# Patient Record
Sex: FEMALE | Race: OTHER | URBAN - METROPOLITAN AREA
[De-identification: names, ages, dates, MRNs, and addresses within clinical notes are randomized per-mention and may not be internally consistent; named-entity substitution may affect disease eponyms.]

---

## 2017-01-24 ENCOUNTER — IMPORTED ENCOUNTER (OUTPATIENT)
Dept: URBAN - METROPOLITAN AREA CLINIC 50 | Facility: CLINIC | Age: 72
End: 2017-01-24

## 2017-01-27 ENCOUNTER — IMPORTED ENCOUNTER (OUTPATIENT)
Dept: URBAN - METROPOLITAN AREA CLINIC 50 | Facility: CLINIC | Age: 72
End: 2017-01-27

## 2017-01-31 ENCOUNTER — IMPORTED ENCOUNTER (OUTPATIENT)
Dept: URBAN - METROPOLITAN AREA CLINIC 50 | Facility: CLINIC | Age: 72
End: 2017-01-31

## 2017-02-07 ENCOUNTER — IMPORTED ENCOUNTER (OUTPATIENT)
Dept: URBAN - METROPOLITAN AREA CLINIC 50 | Facility: CLINIC | Age: 72
End: 2017-02-07

## 2017-02-14 ENCOUNTER — IMPORTED ENCOUNTER (OUTPATIENT)
Dept: URBAN - METROPOLITAN AREA CLINIC 50 | Facility: CLINIC | Age: 72
End: 2017-02-14

## 2017-03-13 ENCOUNTER — IMPORTED ENCOUNTER (OUTPATIENT)
Dept: URBAN - METROPOLITAN AREA CLINIC 50 | Facility: CLINIC | Age: 72
End: 2017-03-13

## 2017-03-21 ENCOUNTER — IMPORTED ENCOUNTER (OUTPATIENT)
Dept: URBAN - METROPOLITAN AREA CLINIC 50 | Facility: CLINIC | Age: 72
End: 2017-03-21

## 2017-04-11 ENCOUNTER — IMPORTED ENCOUNTER (OUTPATIENT)
Dept: URBAN - METROPOLITAN AREA CLINIC 50 | Facility: CLINIC | Age: 72
End: 2017-04-11

## 2017-05-09 ENCOUNTER — IMPORTED ENCOUNTER (OUTPATIENT)
Dept: URBAN - METROPOLITAN AREA CLINIC 50 | Facility: CLINIC | Age: 72
End: 2017-05-09

## 2017-05-25 ENCOUNTER — IMPORTED ENCOUNTER (OUTPATIENT)
Dept: URBAN - METROPOLITAN AREA CLINIC 50 | Facility: CLINIC | Age: 72
End: 2017-05-25

## 2017-07-31 ENCOUNTER — IMPORTED ENCOUNTER (OUTPATIENT)
Dept: URBAN - METROPOLITAN AREA CLINIC 50 | Facility: CLINIC | Age: 72
End: 2017-07-31

## 2018-07-30 ENCOUNTER — IMPORTED ENCOUNTER (OUTPATIENT)
Dept: URBAN - METROPOLITAN AREA CLINIC 50 | Facility: CLINIC | Age: 73
End: 2018-07-30

## 2018-07-31 ENCOUNTER — IMPORTED ENCOUNTER (OUTPATIENT)
Dept: URBAN - METROPOLITAN AREA CLINIC 50 | Facility: CLINIC | Age: 73
End: 2018-07-31

## 2018-08-10 ENCOUNTER — IMPORTED ENCOUNTER (OUTPATIENT)
Dept: URBAN - METROPOLITAN AREA CLINIC 50 | Facility: CLINIC | Age: 73
End: 2018-08-10

## 2018-10-01 ENCOUNTER — IMPORTED ENCOUNTER (OUTPATIENT)
Dept: URBAN - METROPOLITAN AREA CLINIC 50 | Facility: CLINIC | Age: 73
End: 2018-10-01

## 2019-04-10 NOTE — PATIENT DISCUSSION
New Prescription: doxycycline hyclate (doxycycline hyclate): tablet: 100 mg 1 tablet twice a day as directed by mouth 04-

## 2019-04-10 NOTE — PATIENT DISCUSSION
CHALAZION OU: PRESCRIBED WARM COMPRESSES, EYELID SCRUBS AND DOXYCYCLINE 100MG BID PO X 2 WEEKS AND TOBRADEX ROCIO QHS X 2 WEEKS. 2cc of 40 mg  KENALOG INJECTION GIVEN TODAY.

## 2019-04-10 NOTE — PATIENT DISCUSSION
New Prescription: TobraDex (tobramycin-dexamethasone): ointment: 0.3-0.1% 1 a small amount at bedtime as directed on eyelid 04-

## 2019-07-29 ENCOUNTER — IMPORTED ENCOUNTER (OUTPATIENT)
Dept: URBAN - METROPOLITAN AREA CLINIC 50 | Facility: CLINIC | Age: 74
End: 2019-07-29

## 2020-12-03 ENCOUNTER — IMPORTED ENCOUNTER (OUTPATIENT)
Dept: URBAN - METROPOLITAN AREA CLINIC 50 | Facility: CLINIC | Age: 75
End: 2020-12-03

## 2021-04-17 ASSESSMENT — TONOMETRY
OS_IOP_MMHG: 14
OS_IOP_MMHG: 12
OD_IOP_MMHG: 13
OS_IOP_MMHG: 12
OS_IOP_MMHG: 15
OS_IOP_MMHG: 11
OD_IOP_MMHG: 19
OS_IOP_MMHG: 12
OS_IOP_MMHG: 20
OD_IOP_MMHG: 12
OD_IOP_MMHG: 10
OD_IOP_MMHG: 18
OD_IOP_MMHG: 12
OS_IOP_MMHG: 15
OD_IOP_MMHG: 15
OD_IOP_MMHG: 14
OS_IOP_MMHG: 11

## 2021-04-17 ASSESSMENT — VISUAL ACUITY
OD_SC: 20/25
OS_CC: J1+
OS_CC: 20/200
OS_CC: 20/30
OD_CC: 20/20
OS_SC: 20/80
OD_SC: 20/25-
OS_SC: 20/30-2
OS_CC: J1+
OS_CC: J2-
OS_CC: J1+
OS_SC: 20/400
OD_SC: 20/30-
OD_SC: 20/70
OD_SC: 20/25-3
OS_PH: 20/40
OD_CC: J2-
OS_CC: 20/200
OS_CC: J1+@ 16 IN
OD_CC: J1+@ 16 IN
OS_PH: 20/30
OD_CC: J1+
OS_PH: 20/70
OD_CC: J1+
OS_SC: 20/200
OS_CC: 20/200
OS_SC: 20/30
OD_SC: 20/20
OS_CC: 20/70
OD_SC: 20/50+1
OD_CC: J1+
OD_CC: 20/25-3
OD_CC: 20/20
OS_SC: 20/40+

## 2021-12-15 ENCOUNTER — PREPPED CHART (OUTPATIENT)
Dept: URBAN - METROPOLITAN AREA CLINIC 53 | Facility: CLINIC | Age: 76
End: 2021-12-15

## 2021-12-16 ENCOUNTER — COMPREHENSIVE EXAM (OUTPATIENT)
Dept: URBAN - METROPOLITAN AREA CLINIC 53 | Facility: CLINIC | Age: 76
End: 2021-12-16

## 2021-12-16 DIAGNOSIS — H43.811: ICD-10-CM

## 2021-12-16 DIAGNOSIS — H43.22: ICD-10-CM

## 2021-12-16 DIAGNOSIS — H16.223: ICD-10-CM

## 2021-12-16 DIAGNOSIS — H35.372: ICD-10-CM

## 2021-12-16 DIAGNOSIS — R73.03: ICD-10-CM

## 2021-12-16 DIAGNOSIS — H35.371: ICD-10-CM

## 2021-12-16 PROCEDURE — 92014 COMPRE OPH EXAM EST PT 1/>: CPT

## 2021-12-16 PROCEDURE — 92134 CPTRZ OPH DX IMG PST SGM RTA: CPT

## 2021-12-16 PROCEDURE — 92015 DETERMINE REFRACTIVE STATE: CPT

## 2021-12-16 ASSESSMENT — TONOMETRY
OD_IOP_MMHG: 13
OS_IOP_MMHG: 13

## 2021-12-16 ASSESSMENT — VISUAL ACUITY
OD_GLARE: 20/70
OD_GLARE: 20/50
OS_GLARE: 20/50
OS_CC: 20/150
OD_CC: 20/20-1
OU_CC: J1+
OU_CC: 20/20
OS_GLARE: 20/25

## 2022-07-26 ENCOUNTER — COMPREHENSIVE EXAM (OUTPATIENT)
Dept: URBAN - METROPOLITAN AREA CLINIC 53 | Facility: CLINIC | Age: 77
End: 2022-07-26

## 2022-07-26 DIAGNOSIS — Z97.3: ICD-10-CM

## 2022-07-26 DIAGNOSIS — R73.03: ICD-10-CM

## 2022-07-26 DIAGNOSIS — H35.373: ICD-10-CM

## 2022-07-26 DIAGNOSIS — H43.811: ICD-10-CM

## 2022-07-26 DIAGNOSIS — H43.22: ICD-10-CM

## 2022-07-26 DIAGNOSIS — H16.223: ICD-10-CM

## 2022-07-26 PROCEDURE — 92014 COMPRE OPH EXAM EST PT 1/>: CPT

## 2022-07-26 PROCEDURE — 92134 CPTRZ OPH DX IMG PST SGM RTA: CPT

## 2022-07-26 ASSESSMENT — VISUAL ACUITY
OS_SC: 20/20
OD_SC: 20/30
OD_GLARE: 20/60
OS_GLARE: 20/30
OD_PH: 20/20
OD_GLARE: 20/80
OS_GLARE: 20/30

## 2022-07-26 ASSESSMENT — KERATOMETRY
OD_K1POWER_DIOPTERS: 44.25
OS_AXISANGLE_DEGREES: 172
OS_AXISANGLE2_DEGREES: 82
OS_K2POWER_DIOPTERS: 45.50
OS_K1POWER_DIOPTERS: 44.00
OD_AXISANGLE_DEGREES: 4
OD_AXISANGLE2_DEGREES: 94
OD_K2POWER_DIOPTERS: 45.50

## 2022-07-26 ASSESSMENT — TONOMETRY
OS_IOP_MMHG: 13
OD_IOP_MMHG: 13

## 2022-08-09 ENCOUNTER — FOLLOW UP (OUTPATIENT)
Dept: URBAN - METROPOLITAN AREA CLINIC 53 | Facility: CLINIC | Age: 77
End: 2022-08-09

## 2022-08-09 DIAGNOSIS — Z97.3: ICD-10-CM

## 2022-08-09 PROCEDURE — 92310-4E ESTABLISHED CL PATIENT RGP SINGLE VISION HARD LENS EVALUATION

## 2022-08-09 ASSESSMENT — VISUAL ACUITY
OD_CC: 20/25
OU_CC: J1+@16
OS_CC: 20/30
OD_SC: 20/25
OS_SC: 20/20

## 2022-08-09 ASSESSMENT — KERATOMETRY
OS_K2POWER_DIOPTERS: 45.50
OD_K1POWER_DIOPTERS: 44.25
OD_K2POWER_DIOPTERS: 45.50
OS_K1POWER_DIOPTERS: 44.00
OD_AXISANGLE2_DEGREES: 94
OD_AXISANGLE_DEGREES: 4
OS_AXISANGLE_DEGREES: 172
OS_AXISANGLE2_DEGREES: 82

## 2023-11-01 ENCOUNTER — COMPREHENSIVE EXAM (OUTPATIENT)
Dept: URBAN - METROPOLITAN AREA CLINIC 53 | Facility: CLINIC | Age: 78
End: 2023-11-01

## 2023-11-01 DIAGNOSIS — H16.223: ICD-10-CM

## 2023-11-01 DIAGNOSIS — R73.03: ICD-10-CM

## 2023-11-01 DIAGNOSIS — H43.22: ICD-10-CM

## 2023-11-01 DIAGNOSIS — H35.373: ICD-10-CM

## 2023-11-01 PROCEDURE — 92134 CPTRZ OPH DX IMG PST SGM RTA: CPT

## 2023-11-01 PROCEDURE — 92014 COMPRE OPH EXAM EST PT 1/>: CPT

## 2023-11-01 ASSESSMENT — VISUAL ACUITY
OS_GLARE: 20/20
OU_CC: J1+
OD_GLARE: 20/20
OU_CC: 20/20
OD_CC: 20/20
OS_CC: 20/20
OS_GLARE: 20/20
OD_GLARE: 20/20

## 2023-11-01 ASSESSMENT — KERATOMETRY
OD_K1POWER_DIOPTERS: 44.25
OS_AXISANGLE2_DEGREES: 82
OS_AXISANGLE_DEGREES: 172
OS_K2POWER_DIOPTERS: 45.50
OD_AXISANGLE2_DEGREES: 94
OD_AXISANGLE_DEGREES: 4
OD_K2POWER_DIOPTERS: 45.50
OS_K1POWER_DIOPTERS: 44.00

## 2023-11-01 ASSESSMENT — TONOMETRY
OD_IOP_MMHG: 16
OS_IOP_MMHG: 16

## 2023-11-10 ENCOUNTER — CONTACT LENSES/GLASSES VISIT (OUTPATIENT)
Dept: URBAN - METROPOLITAN AREA CLINIC 53 | Facility: CLINIC | Age: 78
End: 2023-11-10

## 2023-11-10 DIAGNOSIS — Z97.3: ICD-10-CM

## 2023-11-10 PROCEDURE — 92310N CONTACT LENS F/U, 3 OR LESS N/C

## 2023-11-10 ASSESSMENT — VISUAL ACUITY
OD_PH: 20/25
OD_SC: 20/30
OU_SC: 20/30
OU_CC: J1+

## 2023-11-10 ASSESSMENT — KERATOMETRY
OD_K2POWER_DIOPTERS: 45.50
OS_K2POWER_DIOPTERS: 45.50
OD_AXISANGLE_DEGREES: 4
OD_AXISANGLE2_DEGREES: 94
OS_AXISANGLE_DEGREES: 172
OS_AXISANGLE2_DEGREES: 82
OD_K1POWER_DIOPTERS: 44.25
OS_K1POWER_DIOPTERS: 44.00

## 2025-06-02 ENCOUNTER — COMPREHENSIVE EXAM (OUTPATIENT)
Age: 80
End: 2025-06-02

## 2025-06-02 DIAGNOSIS — H35.373: ICD-10-CM

## 2025-06-02 DIAGNOSIS — H43.22: ICD-10-CM

## 2025-06-02 DIAGNOSIS — H52.4: ICD-10-CM

## 2025-06-02 DIAGNOSIS — R73.03: ICD-10-CM

## 2025-06-02 DIAGNOSIS — H43.811: ICD-10-CM

## 2025-06-02 PROCEDURE — 92015 DETERMINE REFRACTIVE STATE: CPT

## 2025-06-02 PROCEDURE — 99214 OFFICE O/P EST MOD 30 MIN: CPT
